# Patient Record
Sex: MALE | URBAN - METROPOLITAN AREA
[De-identification: names, ages, dates, MRNs, and addresses within clinical notes are randomized per-mention and may not be internally consistent; named-entity substitution may affect disease eponyms.]

---

## 2023-05-29 ENCOUNTER — ANESTHESIA EVENT (OUTPATIENT)
Dept: SURGERY | Facility: CLINIC | Age: 33
End: 2023-05-29
Payer: COMMERCIAL

## 2023-05-30 ENCOUNTER — APPOINTMENT (OUTPATIENT)
Dept: GENERAL RADIOLOGY | Facility: CLINIC | Age: 33
End: 2023-05-30
Attending: SURGERY
Payer: COMMERCIAL

## 2023-05-30 ENCOUNTER — HOSPITAL ENCOUNTER (OUTPATIENT)
Facility: CLINIC | Age: 33
End: 2023-05-30
Attending: SURGERY | Admitting: SURGERY
Payer: COMMERCIAL

## 2023-05-30 ENCOUNTER — ANESTHESIA (OUTPATIENT)
Dept: SURGERY | Facility: CLINIC | Age: 33
End: 2023-05-30
Payer: COMMERCIAL

## 2023-05-30 LAB
BASE EXCESS BLDA CALC-SCNC: 2.6 MMOL/L (ref -9.6–2)
CA-I BLD-MCNC: 5.2 MG/DL (ref 4.4–5.2)
GLUCOSE BLD-MCNC: 170 MG/DL (ref 70–99)
HCO3 BLDA-SCNC: 26 MMOL/L (ref 21–28)
HGB BLD-MCNC: 10.2 G/DL (ref 13.3–17.7)
LACTATE BLD-SCNC: 1.4 MMOL/L
O2/TOTAL GAS SETTING VFR VENT: 95 %
PCO2 BLDA: 36 MM HG (ref 35–45)
PH BLDA: 7.48 [PH] (ref 7.35–7.45)
PO2 BLDA: >600 MM HG (ref 80–105)
POTASSIUM BLD-SCNC: 3.2 MMOL/L (ref 3.5–5)
SODIUM BLD-SCNC: 164 MMOL/L (ref 133–144)

## 2023-05-30 PROCEDURE — 82330 ASSAY OF CALCIUM: CPT

## 2023-05-30 PROCEDURE — 250N000009 HC RX 250

## 2023-05-30 PROCEDURE — 258N000003 HC RX IP 258 OP 636: Performed by: NURSE ANESTHETIST, CERTIFIED REGISTERED

## 2023-05-30 PROCEDURE — 84295 ASSAY OF SERUM SODIUM: CPT

## 2023-05-30 PROCEDURE — 250N000009 HC RX 250: Performed by: NURSE ANESTHETIST, CERTIFIED REGISTERED

## 2023-05-30 PROCEDURE — 999N000063 XR ABDOMEN PORT 1 VIEW

## 2023-05-30 PROCEDURE — 360N000079 HC SURGERY LEVEL 6, PER MIN

## 2023-05-30 PROCEDURE — 250N000011 HC RX IP 250 OP 636: Performed by: NURSE ANESTHETIST, CERTIFIED REGISTERED

## 2023-05-30 PROCEDURE — 74018 RADEX ABDOMEN 1 VIEW: CPT | Mod: 26 | Performed by: RADIOLOGY

## 2023-05-30 PROCEDURE — 71045 X-RAY EXAM CHEST 1 VIEW: CPT | Mod: 26 | Performed by: RADIOLOGY

## 2023-05-30 PROCEDURE — 250N000024 HC ISOFLURANE, PER MIN

## 2023-05-30 PROCEDURE — 999N000063 XR CHEST PORT 1 VIEW

## 2023-05-30 PROCEDURE — 272N000001 HC OR GENERAL SUPPLY STERILE

## 2023-05-30 PROCEDURE — 370N000017 HC ANESTHESIA TECHNICAL FEE, PER MIN

## 2023-05-30 PROCEDURE — 258N000003 HC RX IP 258 OP 636

## 2023-05-30 PROCEDURE — 250N000011 HC RX IP 250 OP 636: Performed by: ANESTHESIOLOGY

## 2023-05-30 PROCEDURE — 82803 BLOOD GASES ANY COMBINATION: CPT

## 2023-05-30 RX ORDER — HEPARIN SODIUM 1000 [USP'U]/ML
INJECTION, SOLUTION INTRAVENOUS; SUBCUTANEOUS PRN
Status: DISCONTINUED | OUTPATIENT
Start: 2023-05-30 | End: 2023-05-30

## 2023-05-30 RX ORDER — SODIUM CHLORIDE, SODIUM LACTATE, POTASSIUM CHLORIDE, CALCIUM CHLORIDE 600; 310; 30; 20 MG/100ML; MG/100ML; MG/100ML; MG/100ML
INJECTION, SOLUTION INTRAVENOUS CONTINUOUS PRN
Status: DISCONTINUED | OUTPATIENT
Start: 2023-05-30 | End: 2023-05-30

## 2023-05-30 RX ORDER — SODIUM CHLORIDE, SODIUM GLUCONATE, SODIUM ACETATE, POTASSIUM CHLORIDE AND MAGNESIUM CHLORIDE 526; 502; 368; 37; 30 MG/100ML; MG/100ML; MG/100ML; MG/100ML; MG/100ML
INJECTION, SOLUTION INTRAVENOUS CONTINUOUS PRN
Status: DISCONTINUED | OUTPATIENT
Start: 2023-05-30 | End: 2023-05-30

## 2023-05-30 RX ORDER — NITROGLYCERIN 10 MG/100ML
INJECTION INTRAVENOUS PRN
Status: DISCONTINUED | OUTPATIENT
Start: 2023-05-30 | End: 2023-05-30

## 2023-05-30 RX ADMIN — LIDOCAINE HYDROCHLORIDE 100 MG: 10 INJECTION, SOLUTION EPIDURAL; INFILTRATION; INTRACAUDAL; PERINEURAL at 17:30

## 2023-05-30 RX ADMIN — HEPARIN SODIUM 30000 UNITS: 1000 INJECTION INTRAVENOUS; SUBCUTANEOUS at 17:33

## 2023-05-30 RX ADMIN — NOREPINEPHRINE BITARTRATE 3.2 MCG: 1 INJECTION, SOLUTION, CONCENTRATE INTRAVENOUS at 16:47

## 2023-05-30 RX ADMIN — SODIUM CHLORIDE, SODIUM GLUCONATE, SODIUM ACETATE, POTASSIUM CHLORIDE AND MAGNESIUM CHLORIDE: 526; 502; 368; 37; 30 INJECTION, SOLUTION INTRAVENOUS at 15:40

## 2023-05-30 RX ADMIN — NOREPINEPHRINE BITARTRATE 3.2 MCG: 1 INJECTION, SOLUTION, CONCENTRATE INTRAVENOUS at 17:37

## 2023-05-30 RX ADMIN — SODIUM CHLORIDE, POTASSIUM CHLORIDE, SODIUM LACTATE AND CALCIUM CHLORIDE: 600; 310; 30; 20 INJECTION, SOLUTION INTRAVENOUS at 16:00

## 2023-05-30 RX ADMIN — Medication 0.14 UNITS/KG/HR: at 16:00

## 2023-05-30 RX ADMIN — NOREPINEPHRINE BITARTRATE 3.2 MCG: 1 INJECTION, SOLUTION, CONCENTRATE INTRAVENOUS at 17:12

## 2023-05-30 RX ADMIN — NITROGLYCERIN 50 MCG: 10 INJECTION INTRAVENOUS at 16:11

## 2023-05-30 RX ADMIN — Medication 100 MG: at 15:45

## 2023-05-30 RX ADMIN — NOREPINEPHRINE BITARTRATE 3.2 MCG: 1 INJECTION, SOLUTION, CONCENTRATE INTRAVENOUS at 16:35

## 2023-05-30 ASSESSMENT — ACTIVITIES OF DAILY LIVING (ADL)
ADLS_ACUITY_SCORE: 35

## 2023-05-30 NOTE — ANESTHESIA CARE TRANSFER NOTE
Patient: Gustavo Rivera    Procedure: Procedure(s):  Surgical procurement       Diagnosis: Cardiac arrest (H) [I46.9]  Diagnosis Additional Information: No value filed.    Anesthesia Type:   General     Note:                    Destination: pt . [facility]red to [facility].          Vitals:  Vitals Value Taken Time   BP     Temp     Pulse     Resp     SpO2         Electronically Signed By: KELVIN Choi CRNA  May 30, 2023  6:27 PM

## 2023-05-30 NOTE — ANESTHESIA PREPROCEDURE EVALUATION
Anesthesia Pre-Procedure Evaluation    Patient: Gustavo Rivera   MRN: [id number] : 1990        Procedure : Procedure(s):  Surgical procurement          Past Medical History:   Diagnosis Date     INGROWING NAIL     recurrent ingrown toenail on right foot      No past surgical history on file.   No Known Allergies   Social History     Tobacco Use     Smoking status: Never     Smokeless tobacco: Not on file   Vaping Use     Vaping status: Not on file   Substance Use Topics     Alcohol use: Not on file      Wt Readings from Last 1 Encounters:   07 61.7 kg (136 lb) (33 %, Z= -0.43)*     * Growth percentiles are based on [facility] (Boys, 2-20 Years) data.        Anesthesia Evaluation            ROS/MED HX  ENT/Pulmonary:       Neurologic: Comment: Brain death following suicide       Cardiovascular:       METS/Exercise Tolerance:     Hematologic:       Musculoskeletal:       GI/Hepatic:       Renal/Genitourinary:       Endo:       Psychiatric/Substance Use:       Infectious Disease:       Malignancy:       Other:            Physical Exam    Airway             Respiratory Devices and Support    ETT:      Dental           Cardiovascular             Pulmonary                   OUTSIDE LABS:  CBC: No results found for: WBC, HGB, HCT, PLT  BMP: No results found for: NA, POTASSIUM, CHLORIDE, CO2, BUN, CR, GLC  COAGS: No results found for: PTT, INR, FIBR  POC: No results found for: BGM, HCG, HCGS  HEPATIC: No results found for: ALBUMIN, PROTTOTAL, ALT, AST, GGT, ALKPHOS, BILITOTAL, BILIDIRECT, STEPHANIA  OTHER: No results found for: PH, LACT, A1C, TEGAN, PHOS, MAG, LIPASE, AMYLASE, TSH, T4, T3, CRP, SED    Anesthesia Plan    ASA Status:  6      Anesthesia Type: General.     - Airway: ETT         Techniques and Equipment:       - Drips/Meds: Nicardipine, Norepi, Vasopressin (Insulin)     Consents            Postoperative Care            Comments:    Other Comments: BP/vent/fluid management            JESSENIA TURNER MD

## 2023-05-31 LAB
BASE EXCESS BLDA CALC-SCNC: 2.7 MMOL/L (ref -9.6–2)
CA-I BLD-MCNC: 5.4 MG/DL (ref 4.4–5.2)
GLUCOSE BLD-MCNC: 168 MG/DL (ref 70–99)
HCO3 BLDA-SCNC: 27 MMOL/L (ref 21–28)
HGB BLD-MCNC: 10 G/DL (ref 13.3–17.7)
LACTATE BLD-SCNC: 1.3 MMOL/L
O2/TOTAL GAS SETTING VFR VENT: 100 %
PCO2 BLDA: 40 MM HG (ref 35–45)
PH BLDA: 7.44 [PH] (ref 7.35–7.45)
PO2 BLDA: 378 MM HG (ref 80–105)
POTASSIUM BLD-SCNC: 3.3 MMOL/L (ref 3.5–5)
SODIUM BLD-SCNC: 164 MMOL/L (ref 133–144)

## 2023-05-31 ASSESSMENT — ACTIVITIES OF DAILY LIVING (ADL)
ADLS_ACUITY_SCORE: 35

## 2023-05-31 NOTE — ANESTHESIA POSTPROCEDURE EVALUATION
Patient: Gustavo Rivera    Procedure: Procedure(s):  Surgical procurement       Anesthesia Type:  General    Note:  Disposition: Organ procurement    Postop Pain Control:    PONV:    Neuro/Psych:    Airway/Respiratory:    CV/Hemodynamics:    Other NRE:    DID A NON-ROUTINE EVENT OCCUR?            Last vitals:  There were no vitals filed for this visit.    Electronically Signed By: JESSENIA TURNER MD  May 30, 2023  7:08 PM

## (undated) DEVICE — DRAPE IOBAN INCISE 23X17" 6650EZ

## (undated) DEVICE — SU PROLENE 4-0 RB-1DA 36" 8557H

## (undated) DEVICE — Device

## (undated) DEVICE — SU SILK 2-0 TIE 12X30" A305H

## (undated) DEVICE — BONE WAX 2.5GM W31G

## (undated) DEVICE — SUCTION TIP YANKAUER W/O VENT K86

## (undated) DEVICE — STPL ENDO LINEAR CUT ECHELON FLEX GST 45MM LONG PLEE45A

## (undated) DEVICE — INSERT FOGARTY 61MM TRACTION HYDRAJAW HYDRA61

## (undated) DEVICE — SU SILK 3-0 TIE 12X30" A304H

## (undated) DEVICE — PACK SET-UP STD 9102

## (undated) DEVICE — ESU LIGASURE IMPACT OPEN SEALER/DVDR CVD LG JAW LF4418

## (undated) DEVICE — CLIP HORIZON SM RED WIDE SLOT 001201

## (undated) DEVICE — NDL BX TRU CUT 14GA 4.5" 2N2702X

## (undated) DEVICE — BLADE SAW STERNAL 20X30MM KM-32

## (undated) DEVICE — CLIP HORIZON MED BLUE 002200

## (undated) DEVICE — SU SILK 3-0 SH CR 8X18" C013D

## (undated) DEVICE — ESU PENCIL SMOKE EVAC W/ROCKER SWITCH 0703-047-000

## (undated) DEVICE — SUCTION TIP POOLE K770

## (undated) DEVICE — CLIP HORIZON LG ORANGE 004200

## (undated) DEVICE — TUBING SUCTION 10'X3/16" N510

## (undated) DEVICE — SU PROLENE 6-0 RB-2DA 30" 8711H

## (undated) DEVICE — SPONGE LAP 18X18" X8435

## (undated) DEVICE — DRAPE ISOLATION BAG 1003

## (undated) DEVICE — SUCTION TIP YANKAUER STR K87

## (undated) DEVICE — STPL RELOAD REG TISSUE ECHELON 45 X 3.6MM BLUE GST45B

## (undated) DEVICE — SU ETHIBOND 5 LRDA 30" B499T

## (undated) DEVICE — DRAPE FLUID WARMING 52 X 60" ORS-321

## (undated) DEVICE — SU SILK 0 TIE 6X30" A306H

## (undated) DEVICE — DRAPE POUCH INSTRUMENT 1018

## (undated) DEVICE — DRSG TELFA 3X8" 1238

## (undated) DEVICE — SU PROLENE 5-0 RB-1DA 36"  8556H

## (undated) RX ORDER — HEPARIN SODIUM 1000 [USP'U]/ML
INJECTION, SOLUTION INTRAVENOUS; SUBCUTANEOUS
Status: DISPENSED
Start: 2023-05-30